# Patient Record
Sex: MALE | Race: WHITE
[De-identification: names, ages, dates, MRNs, and addresses within clinical notes are randomized per-mention and may not be internally consistent; named-entity substitution may affect disease eponyms.]

---

## 2020-03-18 ENCOUNTER — HOSPITAL ENCOUNTER (OUTPATIENT)
Dept: HOSPITAL 75 - PREOP | Age: 40
Discharge: HOME | End: 2020-03-18
Attending: SURGERY
Payer: SELF-PAY

## 2020-03-18 VITALS — WEIGHT: 208.34 LBS | BODY MASS INDEX: 31.57 KG/M2 | HEIGHT: 67.99 IN

## 2020-03-18 DIAGNOSIS — Z01.818: Primary | ICD-10-CM

## 2020-03-23 ENCOUNTER — HOSPITAL ENCOUNTER (OUTPATIENT)
Dept: HOSPITAL 75 - ENDO | Age: 40
Discharge: HOME | End: 2020-03-23
Attending: SURGERY
Payer: COMMERCIAL

## 2020-03-23 VITALS — DIASTOLIC BLOOD PRESSURE: 71 MMHG | SYSTOLIC BLOOD PRESSURE: 119 MMHG

## 2020-03-23 VITALS — BODY MASS INDEX: 31.57 KG/M2 | WEIGHT: 208.34 LBS | HEIGHT: 67.99 IN

## 2020-03-23 VITALS — DIASTOLIC BLOOD PRESSURE: 62 MMHG | SYSTOLIC BLOOD PRESSURE: 110 MMHG

## 2020-03-23 VITALS — SYSTOLIC BLOOD PRESSURE: 122 MMHG | DIASTOLIC BLOOD PRESSURE: 79 MMHG

## 2020-03-23 VITALS — DIASTOLIC BLOOD PRESSURE: 79 MMHG | SYSTOLIC BLOOD PRESSURE: 127 MMHG

## 2020-03-23 VITALS — DIASTOLIC BLOOD PRESSURE: 76 MMHG | SYSTOLIC BLOOD PRESSURE: 123 MMHG

## 2020-03-23 VITALS — DIASTOLIC BLOOD PRESSURE: 66 MMHG | SYSTOLIC BLOOD PRESSURE: 111 MMHG

## 2020-03-23 DIAGNOSIS — K64.8: ICD-10-CM

## 2020-03-23 DIAGNOSIS — K21.9: ICD-10-CM

## 2020-03-23 DIAGNOSIS — K63.5: ICD-10-CM

## 2020-03-23 DIAGNOSIS — K22.10: Primary | ICD-10-CM

## 2020-03-23 DIAGNOSIS — F17.210: ICD-10-CM

## 2020-03-23 DIAGNOSIS — Z80.0: ICD-10-CM

## 2020-03-23 DIAGNOSIS — K29.50: ICD-10-CM

## 2020-03-23 DIAGNOSIS — K57.30: ICD-10-CM

## 2020-03-23 DIAGNOSIS — K44.9: ICD-10-CM

## 2020-03-23 NOTE — PROGRESS NOTE-POST OPERATIVE
Post-Operative Progess Note


Surgeon (s)/Assistant (s)


Surgeon


LIDIA WHITLEY DO


Assistant:  none





Pre-Operative Diagnosis


Rectal Bleed, Gastritis





Post-Operative Diagnosis





Esophageal ulcers


Hiatal hernia


Gastritis


Polyp


Diverticula


internal hemorrhoids





Procedure & Operative Findings


Date of Procedure


3/23/20


Procedure Performed/Findings


EGD with bx


Colon with snare


Colon with cold bx


Anesthesia Type


IV sedation by CRNA





Estimated Blood Loss


Estimated blood loss (mL):  scant





Specimens/Packing


Specimens Removed


antral bx


Body of stomach bx


Esophageal bx


TI bx


descending colon polyp











LIDIA WHITLEY DO               Mar 23, 2020 09:37

## 2020-03-23 NOTE — ENDOSCOPY DISCHARGE INSTRUCT
Endo Procedure/Findings


Findings


1.:  Hiatal Hernia, Gastritis, Other Findings (Esophageal ulcers)


2.:  Polyp


3.:  Diverticulosis


4.:  Internal Hemorrhoids





Discharge Instructions


-


Activity: You might feel a little sleepy until tomorrow.  This is due to the 

medicine you received to relax you.





Until tomorrow, you should:  


   NOT drive a car, operate machinery or power tools.


   NOT drink any alcoholic beverages.


   NOT make any important decisions or sign importortant papers.





Do not return to work until tomorrow, unless otherwise instructed. Resume 

previous activities tomorrow.





Diet: Start by taking liquids.  If you tolerate liquids, advance to solid food.





make an appointment for one week


1.:  Colonscopy in 5 years, EGD in 6-8 weeks





Notify Physician


-


If you experience excessive bleeding, unusual abdominal pain, fever, or chest 

pain, contact your doctor immediately.











LIDIA WHITLEY DO               Mar 23, 2020 09:38

## 2020-03-23 NOTE — PROGRESS NOTE-PRE OPERATIVE
Pre-Operative Progress Note


H&P Reviewed


The H&P was reviewed, patient examined and no changes noted.


Time Seen by Provider:  08:37


Date H&P Reviewed:  Mar 23, 2020


Time H&P Reviewed:  08:38


Pre-Operative Diagnosis:  Rectal Bleed, Gastritis











LIDIA WHITLEY DO               Mar 23, 2020 08:44

## 2020-03-23 NOTE — OPERATIVE REPORT
DATE OF SERVICE:  



PREOPERATIVE DIAGNOSES:

Rectal bleed and gastritis.



POSTOPERATIVE DIAGNOSES:

1.  Esophageal ulcers, gastritis, hiatal hernia.

2.  Colon polyp.

3.  Diverticula.

4.  Internal hemorrhoids.



PROCEDURE:

1.  EGD with biopsy.

2.  Colonoscopy with snare polypectomy.

3.  Colonoscopy with cold biopsy.



SURGEON:

Qasim Ash DO



FIRST ASSISTANT:

None.



ANESTHESIA:

IV sedation by the CRNA.



SPECIMEN:

Biopsy from the antrum, biopsy of body of stomach, biopsy from the esophagus,

one biopsy from the terminal ileum and then a snare polypectomy of descending

colon polyp.



BLOOD LOSS:

Scant.



FLUIDS:

Per anesthesia.



POSTOPERATIVE CONDITION:

Stable.



INDICATION FOR PROCEDURE:

The patient is a 39-year-old male who has been having a little bit of rectal

bleeding and also noted some gastritis and needed a workup.



FINDINGS:

The patient had some pretty severe esophageal ulcers.  Pictures were taken. 

Mild gastritis, small hiatal hernia.  He also had a small polyp in the

descending colon and he had some diverticula on the right side of the colon as

well as some small internal hemorrhoids and then possibly an ulcer in the

terminal ileum, so biopsy was performed here.



PROCEDURE NOTE:

After informed consent was obtained, the patient was brought to the endoscopy

suite, placed in bed in left lateral decubitus position.  He was administered IV

sedation by the CRNA who then monitored his vitals the entire time, heart rate,

blood pressure and pulse ox and the scope was inserted.  First started with the

EGD, placed the scope down the mouth and through the esophagus.  On the way down

to the esophagus noted some ulcers, pretty severe.  Pictures taken.  Pushed into

the stomach towards the antrum, took a picture of the antrum and then pushing

the duodenum.  Duodenum looked fine, pulled back and did a biopsy of the antrum.

 Retroflexed the scope, did a biopsy of body of stomach, saw a small hiatal

hernia, took a picture of this and then pulled the scope up into the esophagus

and took biopsies of the esophageal ulcers.  Pulled the scope up the mouth and

out and then switched camera, switched gloves, went down below, started the

colonoscopy.  Pushed all the way into about 140 cm, able to get to the cecum,

took a picture of appendiceal orifice and able to get into the terminal ileum. 

In the terminal ileum saw some what looked like almost ulcers, so did a biopsy

of the terminal ileum and then slowly withdrew the scope insufflating to look

circumferentially at the walls looking the cecum, up the ascending colon to the

hepatic flexure, then down the transverse colon, splenic flexure, into the

descending colon.  In the descending colon, I saw a polyp, did snare polypectomy

of this and then continued down into the rectum, retroflexed the scope in the

rectal vault, saw some minimal internal hemorrhoids, took a picture of this and

then removed the scope.  The patient tolerated the procedure and recovered in

endoscopy suite.





Job ID: [id number]

DocumentID: [id number]

Dictated Date:  03/23/2020 13:12:09

Transcription Date: 03/23/2020 14:21:52

Dictated By: QASIM ASH DO

## 2020-03-23 NOTE — ANESTHESIA-GENERAL POST-OP
MAC


Patient Condition


Mental Status/LOC:  Same as Preop


Cardiovascular:  Satisfactory


Nausea/Vomiting:  Absent


Respiratory:  Satisfactory


Pain:  Controlled


Complications:  Absent





Post Op Complications


Complications


None





Follow Up Care/Instructions


Patient Instructions


None needed.





Anesthesiology Discharge Order


Discharge Order


Patient is doing well, no complaints, stable vital signs, no apparent adverse 

anesthesia problems.   


No complications reported per nursing.











AMAN DING CRNA          Mar 23, 2020 10:34

## 2020-05-25 ENCOUNTER — HOSPITAL ENCOUNTER (EMERGENCY)
Dept: HOSPITAL 75 - ER | Age: 40
Discharge: HOME | End: 2020-05-25
Payer: SELF-PAY

## 2020-05-25 VITALS — WEIGHT: 195.77 LBS | HEIGHT: 67.72 IN | BODY MASS INDEX: 30.02 KG/M2

## 2020-05-25 VITALS — DIASTOLIC BLOOD PRESSURE: 91 MMHG | SYSTOLIC BLOOD PRESSURE: 152 MMHG

## 2020-05-25 DIAGNOSIS — F17.210: ICD-10-CM

## 2020-05-25 DIAGNOSIS — Z88.0: ICD-10-CM

## 2020-05-25 DIAGNOSIS — F43.10: ICD-10-CM

## 2020-05-25 DIAGNOSIS — K02.9: Primary | ICD-10-CM

## 2020-05-25 PROCEDURE — 99282 EMERGENCY DEPT VISIT SF MDM: CPT

## 2020-05-25 NOTE — ED EENT
History of Present Illness


General


Chief Complaint:  Dental Problems/Pain


Stated Complaint:  POSSIBLE GUM INFECTION


Source:  patient





History of Present Illness


Date Seen by Provider:  May 25, 2020


Time Seen by Provider:  12:45


Initial Comments


PT ARRIVES VIA POV FROM HOME


C/O DENTAL INFECTION/"INFECTED GUMS" FOR AT LEAST 2 MONTHS


NO FEVER


HAS NOT SOUGHT CARE UNTIL TODAY


SYMPTOMS NO DIFFERENT TODAY


HAS NOT TAKEN ANYTHING FOR SYMPTOMS AT ANY TIME





PCP: [id number]





Allergies and Home Medications


Allergies


Coded Allergies:  


     Penicillins (Verified  Allergy, Unknown, 4/17/08)





Home Medications


Bupropion HCl 300 Mg Tab.er.24h, 300 MG PO DAILY, (Reported)


Clindamycin HCl 300 Mg Capsule, 300 MG PO QID


   Prescribed by: RADHA ALONSO on 5/25/20 1254


Gabapentin 400 Mg Capsule, 400 MG PO TID, (Reported)


Lidocaine HCl 15 Ml Solution, 1-2 ML MM [id number]


   Prescribed by: RADHA ALONSO on 5/25/20 1254


Mirtazapine 45 Mg Tab.rapdis, 45 MG PO HS, (Reported)


Naproxen 500 Mg Tablet, 500 MG PO BID


   Prescribed by: RADHA ALONSO on 5/25/20 1254


Pantoprazole Sodium 20 Mg Tablet.dr, 20 MG PO DAILY, (Reported)


Prazosin HCl 1 Mg Capsule, 1 MG PO BID, (Reported)


Sertraline HCl 100 Mg Tablet, 100 MG PO DAILY, (Reported)


Sucralfate 1 Gm Tablet, 1 GM PO ACHS


   Prescribed by: LIDIA WHITLEY on 3/23/20 0937





Patient Home Medication List


Home Medication List Reviewed:  Yes





Review of Systems


Review of Systems


Constitutional:  no symptoms reported


Mouth:  see HPI


Throat:  no symptoms reported


Respiratory:  no symptoms reported


Cardiovascular:  no symptoms reported


Gastrointestinal:  no symptoms reported


Neurological:  No Symptoms Reported





Past Medical-Social-Family Hx


Past Med/Social Hx:  Reviewed and Corrections made


Patient Social History


Alcohol Use:  Denies Use


Recreational Drug Use:  No


Smoking Status:  Current Everyday Smoker (1 PPD)


Type Used:  Cigarettes


2nd Hand Smoke Exposure:  No


Recent Hopitalizations:  No





Immunizations Up To Date


Tetanus Booster (TDap):  Unknown


Date of Influenza Vaccine:  Oct 1, 2019





Seasonal Allergies


Seasonal Allergies:  No





Past Medical History


Surgeries:  Yes (CIRCUMCISION)


Respiratory:  No


Currently Using CPAP:  No


Currently Using BIPAP:  No


Cardiac:  No


Neurological:  Yes


Neuropathy


Reproductive Disorders:  Yes


Genitourinary:  No


Gastrointestinal:  Yes (GASTRITIS)


Musculoskeletal:  No


Endocrine:  No


HEENT:  No


Cancer:  No


Psychosocial:  Yes


PTSD


Integumentary:  No


Blood Disorders:  No





Physical Exam


Vital Signs





Vital Signs - First Documented








 5/25/20





 12:50


 


Temp 36.9


 


Pulse 94


 


Resp 16


 


B/P (MAP) 152/91 (111)








Height, Weight, BMI


Height: 5'8"


Weight: 207lbs. oz. 93.252074ld; 31.68 BMI


Method:Stated


General Appearance:  WD/WN, no apparent distress


Eyes:  bilateral eye normal inspection


Ears:  bilateral ear auricle normal, bilateral ear canal normal, bilateral ear 

TM normal


Nose:  normal inspection


Mouth/Throat:  No mandibular swelling, No maxillary swelling, No trismus; other 

(LEFT LOWER 2ND MOLAR WITH EXTENSIVE DECAY--DOWN TO GUMS WITH MODERATE SWELLING 

AND ERYTHEMA TO ADJACENT GUM TISSUE. NO FLUCTUANCE. NO SWELLING OR ERYTHEMA  TO 

MANDIBLE OR FACE. )


Neck:  non-tender, full range of motion, supple, normal inspection


Cardiovascular:  regular rate, rhythm, no murmur


Respiratory:  normal breath sounds


Neurologic/Psychiatric:  CNs II-XII nml as tested, no motor/sensory deficits, 

alert, normal mood/affect, oriented x 3


Skin:  normal color, warm/dry





Departure


Impression





   Primary Impression:  


   Infected dental caries


Disposition:  01 HOME, SELF-CARE


Condition:  Stable





Departure-Patient Inst.


Referrals:  


LIAT CHAN (Family)


Primary Care Physician








[facility]


Patient Instructions:  Tooth Abscess (DC), Tooth Decay, Adult (DC)





Add. Discharge Instructions:  


WARM SALT WATER SWISHES AS NEEDED FOR COMFORT





CALL DENTAL CLINIC IN THE MORNING FOR AN APPOINTMENT





All discharge instructions reviewed with patient and/or family. Voiced 

understanding.


Scripts


Naproxen (Naprosyn) 500 Mg Tablet


500 MG PO BID, #30 TAB 0 Refills


   Prov: CELESTERADHA K DO         5/25/20 


Lidocaine HCl (Lidocaine HCl Viscous) 15 Ml Solution


1-2 ML MM [id number], #120 ML


   Prov: CELESTERADHA K DO         5/25/20 


Clindamycin HCl (Clindamycin HCl) 300 Mg Capsule


300 MG PO QID for 10 Days, #40 CAP


   Prov: CELESTERADHA K DO         5/25/20











CELESTERADHA K DO                 May 25, 2020 12:54

## 2020-07-06 ENCOUNTER — HOSPITAL ENCOUNTER (EMERGENCY)
Dept: HOSPITAL 75 - ER | Age: 40
Discharge: HOME | End: 2020-07-06
Payer: COMMERCIAL

## 2020-07-06 VITALS — WEIGHT: 189.6 LBS | BODY MASS INDEX: 29.07 KG/M2 | HEIGHT: 67.72 IN

## 2020-07-06 VITALS — SYSTOLIC BLOOD PRESSURE: 132 MMHG | DIASTOLIC BLOOD PRESSURE: 69 MMHG
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
## 2020-07-06 DIAGNOSIS — F17.210: ICD-10-CM

## 2020-07-06 DIAGNOSIS — M54.6: ICD-10-CM

## 2020-07-06 DIAGNOSIS — G62.9: ICD-10-CM

## 2020-07-06 DIAGNOSIS — M54.2: Primary | ICD-10-CM

## 2020-07-06 DIAGNOSIS — Z88.0: ICD-10-CM

## 2020-07-06 DIAGNOSIS — F43.10: ICD-10-CM

## 2020-07-06 PROCEDURE — 72128 CT CHEST SPINE W/O DYE: CPT

## 2020-07-06 PROCEDURE — 72125 CT NECK SPINE W/O DYE: CPT

## 2020-07-06 PROCEDURE — 72131 CT LUMBAR SPINE W/O DYE: CPT
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
## 2021-03-22 ENCOUNTER — HOSPITAL ENCOUNTER (EMERGENCY)
Dept: HOSPITAL 75 - ER | Age: 41
Discharge: HOME | End: 2021-03-22
Payer: COMMERCIAL

## 2021-03-22 VITALS — BODY MASS INDEX: 27.33 KG/M2 | WEIGHT: 180.34 LBS | HEIGHT: 68.03 IN

## 2021-03-22 VITALS — DIASTOLIC BLOOD PRESSURE: 93 MMHG | SYSTOLIC BLOOD PRESSURE: 144 MMHG

## 2021-03-22 DIAGNOSIS — M25.432: ICD-10-CM

## 2021-03-22 DIAGNOSIS — R40.2360: ICD-10-CM

## 2021-03-22 DIAGNOSIS — F43.10: ICD-10-CM

## 2021-03-22 DIAGNOSIS — R40.2140: ICD-10-CM

## 2021-03-22 DIAGNOSIS — Z88.0: ICD-10-CM

## 2021-03-22 DIAGNOSIS — S06.0X1A: Primary | ICD-10-CM

## 2021-03-22 DIAGNOSIS — S50.312A: ICD-10-CM
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
## 2021-03-22 DIAGNOSIS — R40.2250: ICD-10-CM
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
## 2021-03-22 PROCEDURE — 73080 X-RAY EXAM OF ELBOW: CPT

## 2021-03-22 PROCEDURE — 70450 CT HEAD/BRAIN W/O DYE: CPT

## 2021-03-22 PROCEDURE — 72125 CT NECK SPINE W/O DYE: CPT

## 2021-03-22 PROCEDURE — 73110 X-RAY EXAM OF WRIST: CPT

## 2021-03-22 PROCEDURE — 72220 X-RAY EXAM SACRUM TAILBONE: CPT
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
## 2021-03-23 NOTE — DIAGNOSTIC IMAGING REPORT
Indication:  Fall, left elbow injury.



Comparison:  None.



Findings:



3 views of the left elbow demonstrate no fracture or dislocation.

Articular surfaces are normal. No joint effusion.



Impression: Negative left elbow.



Dictated by: 



  Dictated on workstation # [id number]

## 2021-03-23 NOTE — DIAGNOSTIC IMAGING REPORT
INDICATION: Fall



COMPARISON: None



FINDINGS:



3 views of the sacrum and coccyx demonstrate normal alignment.

There is no fracture or osseous lesion. SI joints are symmetric.



IMPRESSION: No fracture identified.



Dictated by: 



  Dictated on workstation # [id number]

## 2021-03-23 NOTE — DIAGNOSTIC IMAGING REPORT
INDICATION: Fall, left wrist injury



COMPARISON: None



FINDINGS:



3 views of the left wrist demonstrate no fracture or dislocation.

Articular surfaces are normal. No foreign body.



IMPRESSION: Negative left wrist



Dictated by: 



  Dictated on workstation # [id number]

## 2021-03-23 NOTE — DIAGNOSTIC IMAGING REPORT
CLINICAL INDICATION: Patient with headache, dizzy, nausea after

fall with possible loss of consciousness.



Exam: Head CT without IV contrast with sagittal and coronal

reformations. Axial CT scan of the cervical spine with sagittal

and coronal reformations. Auto Exposure Controls were utilized

during the CT exam to meet ALARA standards for radiation dose

reduction.



Comparison: CT scan of the cervical spine without contrast dated

07/06/2020. Head CT without contrast dated 04/17/2008.



Findings:



Head CT:

There is no evidence of acute cerebral infarct, intracranial

hemorrhage, or gross mass effect. 



The brain parenchymal volume appears appropriate for patient's

age. There is normal gray-white matter distinction.  There is no

significant midline shift or herniation. 



 There is no evidence of hydrocephalus. The basal cisterns are

unremarkable. The skull, extracranial soft tissue, and orbits are

unremarkable. There is near complete consolidation involving the

frontal sinus, ethmoid sinus, both maxillary sinuses. There is

moderate mucosal thickening involving the sphenoid sinus.

Temporal bones show no significant abnormality.



Cervical spine:

There is no acute cervical spine fracture or dislocation. The

vertebral body heights and intervertebral disk heights are

maintained. There is minimal anterior spurring at C5-C6 level.

There is no prevertebral soft tissue swelling. Unfused posterior

elements of the C1 posterior arch noted.

The neck soft tissue structures show no significant abnormality.

Visualized upper lung fields are clear.



Impression:

1: There is diffuse severe paranasal sinusitis. Otherwise,

unremarkable CT scan of the brain.



2: There is no acute cervical spine fracture or dislocation.



I agree with [facility] rad report.



Dictated by: 



  Dictated on workstation # [id number]

## 2021-04-21 ENCOUNTER — HOSPITAL ENCOUNTER (OUTPATIENT)
Dept: HOSPITAL 75 - RAD | Age: 41
End: 2021-04-21
Attending: NURSE PRACTITIONER
Payer: SELF-PAY

## 2021-04-21 DIAGNOSIS — J32.9: Primary | ICD-10-CM

## 2021-04-21 DIAGNOSIS — S06.0X1D: ICD-10-CM
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
## 2021-04-21 PROCEDURE — 70470 CT HEAD/BRAIN W/O & W/DYE: CPT
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
## 2023-11-18 ENCOUNTER — HOSPITAL ENCOUNTER (EMERGENCY)
Dept: HOSPITAL 75 - ER | Age: 43
Discharge: HOME | End: 2023-11-18
Payer: SELF-PAY

## 2023-11-18 VITALS — DIASTOLIC BLOOD PRESSURE: 103 MMHG | SYSTOLIC BLOOD PRESSURE: 146 MMHG

## 2023-11-18 VITALS — BODY MASS INDEX: 24.49 KG/M2 | HEIGHT: 72.05 IN | WEIGHT: 180.78 LBS

## 2023-11-18 DIAGNOSIS — N13.2: Primary | ICD-10-CM

## 2023-11-18 LAB
ALBUMIN SERPL-MCNC: 4.2 GM/DL (ref 3.2–4.5)
ALP SERPL-CCNC: 89 U/L (ref 40–136)
ALT SERPL-CCNC: 13 U/L (ref 0–55)
AMORPH SED URNS QL MICRO: (no result) /LPF
APTT PPP: YELLOW S
BACTERIA #/AREA URNS HPF: (no result) /HPF
BASOPHILS # BLD AUTO: 0.1 10^3/UL (ref 0–0.1)
BASOPHILS NFR BLD AUTO: 1 % (ref 0–10)
BILIRUB SERPL-MCNC: 0.3 MG/DL (ref 0.1–1)
BILIRUB UR QL STRIP: NEGATIVE
BUN/CREAT SERPL: 17
CALCIUM SERPL-MCNC: 9.2 MG/DL (ref 8.5–10.1)
CHLORIDE SERPL-SCNC: 104 MMOL/L (ref 98–107)
CO2 SERPL-SCNC: 27 MMOL/L (ref 21–32)
CREAT SERPL-MCNC: 1.07 MG/DL (ref 0.6–1.3)
EOSINOPHIL # BLD AUTO: 0.4 10^3/UL (ref 0–0.3)
EOSINOPHIL NFR BLD AUTO: 3 % (ref 0–10)
FIBRINOGEN PPP-MCNC: CLEAR MG/DL
GFR SERPLBLD BASED ON 1.73 SQ M-ARVRAT: 89 ML/MIN
GLUCOSE SERPL-MCNC: 117 MG/DL (ref 70–105)
GLUCOSE UR STRIP-MCNC: NEGATIVE MG/DL
HCT VFR BLD CALC: 45 % (ref 40–54)
HGB BLD-MCNC: 15 G/DL (ref 13.3–17.7)
HYALINE CASTS #/AREA URNS LPF: (no result) /LPF
KETONES UR QL STRIP: NEGATIVE
LEUKOCYTE ESTERASE UR QL STRIP: NEGATIVE
LIPASE SERPL-CCNC: 10 U/L (ref 8–78)
LYMPHOCYTES # BLD AUTO: 2.3 10^3/UL (ref 1–4)
LYMPHOCYTES NFR BLD AUTO: 18 % (ref 12–44)
MANUAL DIFFERENTIAL PERFORMED BLD QL: NO
MCH RBC QN AUTO: 29 PG (ref 25–34)
MCHC RBC AUTO-ENTMCNC: 33 G/DL (ref 32–36)
MCV RBC AUTO: 88 FL (ref 80–99)
MONOCYTES # BLD AUTO: 0.8 10^3/UL (ref 0–1)
MONOCYTES NFR BLD AUTO: 6 % (ref 0–12)
NEUTROPHILS # BLD AUTO: 9.4 10^3/UL (ref 1.8–7.8)
NEUTROPHILS NFR BLD AUTO: 72 % (ref 42–75)
NITRITE UR QL STRIP: NEGATIVE
PH UR STRIP: 6 [PH] (ref 5–9)
PLATELET # BLD: 317 10^3/UL (ref 130–400)
PMV BLD AUTO: 9.1 FL (ref 9–12.2)
POTASSIUM SERPL-SCNC: 4.1 MMOL/L (ref 3.6–5)
PROT SERPL-MCNC: 7 GM/DL (ref 6.4–8.2)
PROT UR QL STRIP: NEGATIVE
RBC #/AREA URNS HPF: (no result) /HPF
SODIUM SERPL-SCNC: 140 MMOL/L (ref 135–145)
SP GR UR STRIP: >=1.03 (ref 1.02–1.02)
SQUAMOUS #/AREA URNS HPF: (no result) /HPF
WBC # BLD AUTO: 12.9 10^3/UL (ref 4.3–11)
WBC #/AREA URNS HPF: (no result) /HPF

## 2023-11-18 PROCEDURE — 96374 THER/PROPH/DIAG INJ IV PUSH: CPT

## 2023-11-18 PROCEDURE — 81000 URINALYSIS NONAUTO W/SCOPE: CPT

## 2023-11-18 PROCEDURE — 86141 C-REACTIVE PROTEIN HS: CPT

## 2023-11-18 PROCEDURE — 74177 CT ABD & PELVIS W/CONTRAST: CPT

## 2023-11-18 PROCEDURE — 80053 COMPREHEN METABOLIC PANEL: CPT

## 2023-11-18 PROCEDURE — 96375 TX/PRO/DX INJ NEW DRUG ADDON: CPT

## 2023-11-18 PROCEDURE — 83690 ASSAY OF LIPASE: CPT

## 2023-11-18 PROCEDURE — 36415 COLL VENOUS BLD VENIPUNCTURE: CPT

## 2023-11-18 PROCEDURE — 85025 COMPLETE CBC W/AUTO DIFF WBC: CPT

## 2023-11-18 NOTE — DIAGNOSTIC IMAGING REPORT
PROCEDURE: CT abdomen and pelvis with contrast, rule out

appendicitis.



TECHNIQUE: Multiple contiguous axial images were obtained through

the abdomen and pelvis after the administration of intravenous

contrast. All CT scans use one or more of the following dose

optimizing techniques: automated exposure control, MA and/or KvP

adjustment based on patient size and exam type or iterative

reconstruction. 



INDICATION: 42-year-old male with intermittent right lower

quadrant abdominal pain with pain radiating to the lower back for

3 days.



COMPARISON: None.



FINDINGS: Lung bases show some minimal subpleural dependent

atelectatic infiltrates. There is no confluent consolidation.

There is no effusion or pneumothorax. Cardiac contour is normal.

Liver shows uniform attenuation. Gallbladder is nondistended.

Spleen and GE junction are normal. Stomach and duodenal sweep are

unremarkable. Pancreas shows sharp margins. Adrenals are normal.

Kidneys appear normal in size, position and contour. There is

mild to moderate right hydronephrosis and right hydroureter.

There is right obstructive uropathy that is secondary to a 4 mm

stone in the right distal ureter. Bladder is nondistended.

Nonopacified loops of small bowel show a few fluid-filled loops

with some air-fluid levels but overall grossly unremarkable.

Large bowel contains fecal material and gas up to the splenic

flexure. The descending colon is mostly decompressed. There is no

free air, free fluid or adenopathy. There is normal caliber of

the aorta, iliac and femoral arteries with normal origin of the

visceral arteries. Bone windows show some mild degenerative

changes in the axial skeleton.



IMPRESSION:

1. Right obstructive uropathy with moderate right hydronephrosis

and right hydroureter with some minimal right perinephric

stranding. This right obstructive uropathy is secondary to a 4 mm

stone in the right distal ureter.

2. No evidence of cholecystitis or appendicitis. Additional

nonemergent findings as described above. 











Dictated by: 



  Dictated on workstation # [id number]

## 2023-11-18 NOTE — ED ABDOMINAL PAIN
General


Chief Complaint:  Abdominal/GI Problems


Stated Complaint:  AB PAIN


Nursing Triage Note:  


INTERMITTANT RLQ  TERAING ABDOMINAL PAIN RADIATING TO LOWER BACK X3 DAYS. 


NAUSEA/DIARRHEA.


Source of Information:  Patient


Exam Limitations:  No Limitations





History of Present Illness


Date Seen by Provider:  2023


Time Seen by Provider:  19:20


Initial Comments


42-year-old male presents to the ER with complaint of right lower quadrant 

abdominal pain for last 3 days.  He states that he has been having cold sweats, 

denies fevers.  He states it feels like someone is ripping apart his right lower

abdomen.  Denies any back pain.  He reports nausea, no vomiting.  Denies 

diarrhea and dysuria.  Denies any previous abdominal surgeries.





Allergies and Home Medications


Allergies


Coded Allergies:  


     Penicillins (Verified  Allergy, Unknown, 08)





Patient Home Medication List


Home Medication List Reviewed:  Yes


Hydrocodone/Acetaminophen (Hydrocodone-Acetamin 5-325 mg) 5 Mg-325 Mg Tablet, 1 

TAB PO Q4H PRN for PAIN-MODERATE (5-7)


   Prescribed by: Angelica Reno on 23


Ondansetron (Ondansetron Odt) 4 Mg Tab.rapdis, 4 MG SL Q4H PRN for 

NAUSEA/VOMITING


   Prescribed by: Angelica Reno on 23


Tamsulosin HCl (Flomax) 0.4 Mg Cap, 0.4 MG PO DAILY


   Prescribed by: Angelica Reno on 23


Discontinued Medications


Bupropion HCl (Wellbutrin Xl) 300 Mg Tab.er.24h, 300 MG PO DAILY, (Reported)


   Discontinued Reason: No Longer Taking


   Entered as Reported by: GNEESIS ANAYA on 3/18/20 0959


   Last Action: Discontinued


Clindamycin HCl (Clindamycin HCl) 300 Mg Capsule, 300 MG PO QID


   Discontinued Reason: No Longer Taking


   Prescribed by: RADHA ALONSO on 20 1254


   Last Action: Discontinued


Cyclobenzaprine HCl (Cyclobenzaprine HCl) 10 Mg Tablet, 10 MG PO Q8H PRN for 

SPASMS


   Discontinued Reason: No Longer Taking


   Prescribed by: ANAYELI KINCAID on 20 2303


   Last Action: Discontinued


Gabapentin (Gabapentin) 400 Mg Capsule, 400 MG PO TID, (Reported)


   Discontinued Reason: No Longer Taking


   Entered as Reported by: GENESIS ANAYA on 3/18/20 0959


   Last Action: Discontinued


Lidocaine HCl (Lidocaine HCl Viscous) 15 Ml Solution, 1-2 ML MM [id number]


   Discontinued Reason: No Longer Taking


   Prescribed by: RADHA ALONSO on 20


   Last Action: Discontinued


Mirtazapine (Remeron) 45 Mg Tab.rapdis, 45 MG PO HS, (Reported)


   Discontinued Reason: No Longer Taking


   Entered as Reported by: GENESIS ANAYA on 3/18/20 0959


   Last Action: Discontinued


Naproxen (Naprosyn) 500 Mg Tablet, 500 MG PO BID


   Discontinued Reason: No Longer Taking


   Prescribed by: RADHA ALONSO on 20


   Last Action: Discontinued


Pantoprazole Sodium (Pantoprazole Sodium) 20 Mg Tablet.dr, 20 MG PO DAILY, 

(Reported)


   Discontinued Reason: No Longer Taking


   Entered as Reported by: GENESIS ANAYA on 3/18/20 0959


   Last Action: Discontinued


Prazosin HCl (Minipress) 1 Mg Capsule, 1 MG PO BID, (Reported)


   Discontinued Reason: No Longer Taking


   Entered as Reported by: GENESIS ANAYA on 3/18/20 0959


   Last Action: Discontinued


Sertraline HCl (Sertraline HCl) 100 Mg Tablet, 100 MG PO DAILY, (Reported)


   Discontinued Reason: No Longer Taking


   Entered as Reported by: GENESIS ANAYA on 3/18/20 0959


   Last Action: Discontinued


Sucralfate (Carafate) 1 Gm Tablet, 1 GM PO ACHS


   Discontinued Reason: No Longer Taking


   Prescribed by: LIDIA WHITLEY on 3/23/20 0937


   Last Action: Discontinued





Review of Systems


Review of Systems


Constitutional:  see HPI





Past Medical-Social-Family Hx


Patient Social History


Tobacco Use?:  Yes


Substance use?:  Yes


Substance type:  Methamphetamine


Alcohol Use?:  No


Pt feels they are or have been:  No





Immunizations Up To Date


Tetanus Booster (TDap):  Unknown


First/Initial COVID19 Vaccinat:  na





Seasonal Allergies


Seasonal Allergies:  No





Past Medical History


Surgery/Hospitalization HX:  


gastritis, ptsd


Surgeries:  Yes (CIRCUMCISION)


Respiratory:  No


Currently Using CPAP:  No


Currently Using BIPAP:  No


Cardiac:  No


Neurological:  Yes


Neuropathy


Reproductive Disorders:  Yes


Genitourinary:  No


Gastrointestinal:  Yes (GASTRITIS)


Musculoskeletal:  No


Endocrine:  No


HEENT:  No


Cancer:  No


Psychosocial:  Yes


PTSD


Integumentary:  No


Blood Disorders:  No





Physical Exam


Vital Signs





Vital Signs - First Documented








 23





 19:14


 


Temp 36.5


 


Pulse 88


 


Resp 22


 


B/P (MAP) 149/97 (114)


 


Pulse Ox 96


 


O2 Delivery Room Air





Capillary Refill : Less Than 3 Seconds


Height/Weight/BMI


Height: 5'8"


Weight: 207lbs. oz. 93.234262yx; 24.00 BMI


Method:Stated


General Appearance:  WD/WN, moderate distress


Neck:  supple, normal inspection


Respiratory:  lungs clear, normal breath sounds, no respiratory distress, no 

accessory muscle use


Cardiovascular:  regular rate, rhythm


Gastrointestinal:  normal bowel sounds, soft; No guarding, No rebound; 

tenderness (Palpation of left lower quadrant, left upper quadrant, and right 

upper quadrant causes pain in right lower quadrant.  Patient also tender to 

palpation right upper quadrant.  He reports that pressure on right lower q

uadrant actually causes the pain to improve.)


Extremities:  normal range of motion, normal inspection


Back:  CVA tenderness (R) (Causes pain in right lower quadrant of abdomen), CVA 

tenderness (L) (Causes pain in right lower quadrant of abdomen)


Neurologic/Psychiatric:  alert, normal mood/affect


Skin:  normal color, warm/dry





Progress/Results/Core Measures


Results/Orders


Lab Results





Laboratory Tests








Test


 23


19:25 23


19:40 Range/Units


 


 


Urine Color YELLOW    


 


Urine Clarity CLEAR    


 


Urine pH 6.0   5-9  


 


Urine Specific Gravity >=1.030   1.016-1.022  


 


Urine Protein NEGATIVE   NEGATIVE  


 


Urine Glucose (UA) NEGATIVE   NEGATIVE  


 


Urine Ketones NEGATIVE   NEGATIVE  


 


Urine Nitrite NEGATIVE   NEGATIVE  


 


Urine Bilirubin NEGATIVE   NEGATIVE  


 


Urine Urobilinogen 0.2   < = 1.0  MG/DL


 


Urine Leukocyte Esterase NEGATIVE   NEGATIVE  


 


Urine RBC (Auto) 3+ H  NEGATIVE  


 


Urine RBC 25-50 H   /HPF


 


Urine WBC 2-5    /HPF


 


Urine Squamous Epithelial


Cells NONE 


 


  /HPF





 


Urine Crystals PRESENT H   /LPF


 


Urine Amorphous Sediment


 FEW IRLANDA


URATES H 


  /LPF





 


Urine Bacteria TRACE    /HPF


 


Urine Casts PRESENT    /LPF


 


Urine Hyaline Casts 2-5 H   /LPF


 


Urine Mucus MODERATE H   /LPF


 


Urine Culture Indicated NO    


 


White Blood Count


 


 12.9 H


 4.3-11.0


10^3/uL


 


Red Blood Count


 


 5.13 


 4.30-5.52


10^6/uL


 


Hemoglobin  15.0  13.3-17.7  g/dL


 


Hematocrit  45  40-54  %


 


Mean Corpuscular Volume  88  80-99  fL


 


Mean Corpuscular Hemoglobin  29  25-34  pg


 


Mean Corpuscular Hemoglobin


Concent 


 33 


 32-36  g/dL





 


Red Cell Distribution Width  12.9  10.0-14.5  %


 


Platelet Count


 


 317 


 130-400


10^3/uL


 


Mean Platelet Volume  9.1  9.0-12.2  fL


 


Immature Granulocyte % (Auto)  0   %


 


Neutrophils (%) (Auto)  72  42-75  %


 


Lymphocytes (%) (Auto)  18  12-44  %


 


Monocytes (%) (Auto)  6  0-12  %


 


Eosinophils (%) (Auto)  3  0-10  %


 


Basophils (%) (Auto)  1  0-10  %


 


Neutrophils # (Auto)


 


 9.4 H


 1.8-7.8


10^3/uL


 


Lymphocytes # (Auto)


 


 2.3 


 1.0-4.0


10^3/uL


 


Monocytes # (Auto)


 


 0.8 


 0.0-1.0


10^3/uL


 


Eosinophils # (Auto)


 


 0.4 H


 0.0-0.3


10^3/uL


 


Basophils # (Auto)


 


 0.1 


 0.0-0.1


10^3/uL


 


Immature Granulocyte # (Auto)


 


 0.1 


 0.0-0.1


10^3/uL


 


Sodium Level  140  135-145  MMOL/L


 


Potassium Level  4.1  3.6-5.0  MMOL/L


 


Chloride Level  104    MMOL/L


 


Carbon Dioxide Level  27  21-32  MMOL/L


 


Anion Gap  9  5-14  MMOL/L


 


Blood Urea Nitrogen  18  7-18  MG/DL


 


Creatinine


 


 1.07 


 0.60-1.30


MG/DL


 


Estimat Glomerular Filtration


Rate 


 89 


  





 


BUN/Creatinine Ratio  17   


 


Glucose Level  117 H   MG/DL


 


Calcium Level  9.2  8.5-10.1  MG/DL


 


Corrected Calcium  9.0  8.5-10.1  MG/DL


 


Total Bilirubin  0.3  0.1-1.0  MG/DL


 


Aspartate Amino Transf


(AST/SGOT) 


 12 


 5-34  U/L





 


Alanine Aminotransferase


(ALT/SGPT) 


 13 


 0-55  U/L





 


Alkaline Phosphatase  89    U/L


 


C-Reactive Protein High


Sensitivity 


 0.30 


 0.00-0.50


MG/DL


 


Total Protein  7.0  6.4-8.2  GM/DL


 


Albumin  4.2  3.2-4.5  GM/DL


 


Lipase  10  8-78  U/L








My Orders





Orders - ANGELICA SOUZA


Ua Culture If Indicated (23 19:19)


Comprehensive Metabolic Panel (23 19:33)


Lipase (23 19:33)


Ed Iv/Invasive Line Start (23 19:33)


Cbc And Automated Diff (23 19:33)


Ct Abd/Pelv W (Appendicitis) (23 19:34)


Hs C Reactive Protein (23 19:34)


Morphine  Injection (Morphine  Injection (23 19:34)


Ondansetron Injection (Ondansetron  Inj (23 19:45)


Rx-Hydrocodone/Apap 5-325 Mg (Rx-Vicodin (23 21:00)


Rx-Ondansetron Po (Rx-Zofran Po) (23 20:58)


Tamsulosin Capsule (Flomax Capsule) (23 21:15)





Medications Given in ED





Current Medications








 Medications  Dose


 Ordered  Sig/Luis Antonio


 Route  Start Time


 Stop Time Status Last Admin


Dose Admin


 


 Acetaminophen/


 Hydrocodone Bitart  1 ea  Q4H  PRN


 PO  23 21:00


 23 21:13 DC 23 21:10


1 EA


 


 Iohexol  100 ml  ONCE  ONCE


 IV  23 19:45


 23 19:47 DC 23 19:55


80 ML


 


 Ondansetron HCl  4 mg  ONCE  ONCE


 IVP  23 19:45


 23 19:46 DC 23 19:44


4 MG


 


 Sodium Chloride  100 ml  ONCE  ONCE


 IV  23 19:45


 23 19:47 DC 23 19:55


80 ML


 


 Tamsulosin HCl  0.4 mg  ONCE  ONCE


 PO  23 21:15


 23 21:13 DC 23 21:10


0.4 MG








Vital Signs/I&O











 23





 19:14 21:11


 


Temp 36.5 36.6


 


Pulse 88 95


 


Resp 22 16


 


B/P (MAP) 149/97 (114) 146/103


 


Pulse Ox 96 98


 


O2 Delivery Room Air Room Air














Blood Pressure Mean:                    114











Progress


Progress Note :  


Progress Note


Patient seen and evaluated, lying in bed, moderate distress.  Based on exam and 

symptoms, differential diagnosis includes but is not limited to appendicitis, 

nephrolithiasis, pyelonephritis, UTI, other intra-abdominal pathology.  Work-up 

initiated including CBC, CMP, CRP, lipase, UA.  Morphine and Zofran ordered.





 Labs and CT reviewed. CBC shows slightly elevated BBC 12.9.  CMP grossly 

normal.  CRP normal.  Lipase normal.  Urinalysis shows 3+ RBCs, negative for 

infection.  CT abdomen pelvis shows 4 mm stone in the distal ureter causing 

moderate right hydronephrosis and hydroureteral with some minimal right 

perinephric stranding.  No evidence of cholecystitis or appendicitis.  Results 

discussed with patient.  Patient appears much more comfortable after pain 

medication.  Will discharge with prescription for Flomax, Norco, and Zofran.  

Patient instructed to follow-up with Dr. Brar, urology.  Will send patient 

home with a urine strainer and specimen cup.  Patient is stable for discharge.  

All questions sought and answered.  Discharge instructions and return 

precautions provided.





Diagnostic Imaging





   Diagonstic Imaging:  CT


   Plain Films/CT/US/NM/MRI:  abdomen, pelvis


Comments


                 [facility] [place], [place]





NAME:   MARITO COLLINS


MED REC#:   [id number]


ACCOUNT#:   [id number]


PT STATUS:   REG ER


:   1980


PHYSICIAN:   ANGELICA SOUZA


ADMIT DATE:   23/ER


                                  ***Signed***


Date of Exam:23





CT ABD/PELV W (APPENDICITIS)








PROCEDURE: CT abdomen and pelvis with contrast, rule out


appendicitis.





TECHNIQUE: Multiple contiguous axial images were obtained through


the abdomen and pelvis after the administration of intravenous


contrast. All CT scans use one or more of the following dose


optimizing techniques: automated exposure control, MA and/or KvP


adjustment based on patient size and exam type or iterative


reconstruction. 





INDICATION: 42-year-old male with intermittent right lower


quadrant abdominal pain with pain radiating to the lower back for


3 days.





COMPARISON: None.





FINDINGS: Lung bases show some minimal subpleural dependent


atelectatic infiltrates. There is no confluent consolidation.


There is no effusion or pneumothorax. Cardiac contour is normal.


Liver shows uniform attenuation. Gallbladder is nondistended.


Spleen and GE junction are normal. Stomach and duodenal sweep are


unremarkable. Pancreas shows sharp margins. Adrenals are normal.


Kidneys appear normal in size, position and contour. There is


mild to moderate right hydronephrosis and right hydroureter.


There is right obstructive uropathy that is secondary to a 4 mm


stone in the right distal ureter. Bladder is nondistended.


Nonopacified loops of small bowel show a few fluid-filled loops


with some air-fluid levels but overall grossly unremarkable.


Large bowel contains fecal material and gas up to the splenic


flexure. The descending colon is mostly decompressed. There is no


free air, free fluid or adenopathy. There is normal caliber of


the aorta, iliac and femoral arteries with normal origin of the


visceral arteries. Bone windows show some mild degenerative


changes in the axial skeleton.





IMPRESSION:


1. Right obstructive uropathy with moderate right hydronephrosis


and right hydroureter with some minimal right perinephric


stranding. This right obstructive uropathy is secondary to a 4 mm


stone in the right distal ureter.


2. No evidence of cholecystitis or appendicitis. Additional


nonemergent findings as described above. 

















Dictated by: 





  Dictated on workstation # [id number]








Dict:   23


Trans:   23


[facility] [id number]





Interpreted by:     DAMIAN ALCANTAR MD


Electronically signed by: DAMIAN ALCANTAR MD 23





Departure


Impression





   Primary Impression:  


   Nephrolithiasis


Disposition:  01 HOME, SELF-CARE


Condition:  Stable





Departure-Patient Inst.


Decision time for Depature:  20:54


Referrals:  


TAMIKA BRAR MD


Patient Instructions:  Kidney Stone Diet





Add. Discharge Instructions:  


Take Norco as needed for pain.  It may make you sleepy and it may cause 

constipation.


Take Zofran as needed for nausea and vomiting.  It may cause constipation.


Take Flomax once a day until the stone passes.


Strain your urine so you know when the stone passes.  Collect the stone and take

it to the urologist.


Follow-up with Dr. Brar, urology.


Return if you are unable to urinate, you develop a fever, recurrent vomiting, 

severe pain, or any other new, concerning, or worsening symptoms.





All discharge instructions reviewed with patient and/or family. Voiced 

understanding.


Scripts


Tamsulosin HCl (Flomax) 0.4 Mg Cap


0.4 MG PO DAILY, #14 CAP 0 Refills


   Prov: ANGELICA SOUZA         23 


Ondansetron (Ondansetron Odt) 4 Mg Tab.rapdis


4 MG SL Q4H PRN for NAUSEA/VOMITING, #12 TAB 0 Refills


   Prov: ANGELICA SOUZA         23 


Hydrocodone/Acetaminophen (Hydrocodone-Acetamin 5-325 mg) 5 Mg-325 Mg Tablet


1 TAB PO Q4H PRN for PAIN-MODERATE (5-7), #12 TAB 0 Refills


   Prov: ANGELICA SOUZA         23





Copy


Copies To 1:   TAMIKA BRAR MD, BRITTANY R APRN          2023 19:41